# Patient Record
Sex: FEMALE | Race: WHITE | Employment: UNEMPLOYED | URBAN - METROPOLITAN AREA
[De-identification: names, ages, dates, MRNs, and addresses within clinical notes are randomized per-mention and may not be internally consistent; named-entity substitution may affect disease eponyms.]

---
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
## 2025-07-29 NOTE — PROGRESS NOTES
12/20/19 1554   Therapeutic Recreation   Type of Intervention structured groups   Activity other (describe)  (Holiday Party)   Response Participates, initiates socially appropriate   Hours 1   Treatment Detail Holiday Party      Warm/Dry